# Patient Record
(demographics unavailable — no encounter records)

---

## 2025-03-11 NOTE — PHYSICAL EXAM
[2+] : left foot dorsalis pedis 2+ [Skin Color & Pigmentation] : normal skin color and pigmentation [Skin Turgor] : normal skin turgor [Skin Lesions] : no skin lesions [No Focal Deficits] : no focal deficits [Sensation] : the sensory exam was normal to light touch and pinprick [Deep Tendon Reflexes (DTR)] : deep tendon reflexes were 2+ and symmetric [Motor Exam] : the motor exam was normal [Ankle Swelling (On Exam)] : not present [Varicose Veins Of Lower Extremities] : not present [] : not present [Delayed in the Right Toes] : capillary refills normal in right toes [Delayed in the Left Toes] : capillary refills normal in the left toes [FreeTextEntry3] : Hair growth noted on digits. Proximal to distal cooling is within normal limits.  [de-identified] : He has equinus and tight posterior muscle group. He has fully compensated forefoot varus with flexible flatfoot with hyperpronation and a flexible low-profile arch with dorsiflexed 1st ray. I did a lower extremity examination and there is no limb length discrepancy. He has bilateral symmetrical ROM with no sign of torsion. [Foot Ulcer] : no foot ulcer [Skin Induration] : no skin induration [Diminished Throughout Right Foot] : normal sensation with monofilament testing throughout right foot [Diminished Throughout Left Foot] : normal sensation with monofilament testing throughout left foot

## 2025-03-11 NOTE — ASSESSMENT
[FreeTextEntry1] : Impression:  Painful flatfoot.  Treatment: I gave him a series of home therapy and stretching exercises that I think will be very beneficial.  I referred him to Aditya, the orthotist, for custom orthotics, which I think will be beneficial over time.

## 2025-03-11 NOTE — PHYSICAL EXAM
[2+] : left foot dorsalis pedis 2+ [Skin Color & Pigmentation] : normal skin color and pigmentation [Skin Turgor] : normal skin turgor [Skin Lesions] : no skin lesions [Sensation] : the sensory exam was normal to light touch and pinprick [No Focal Deficits] : no focal deficits [Deep Tendon Reflexes (DTR)] : deep tendon reflexes were 2+ and symmetric [Motor Exam] : the motor exam was normal [Ankle Swelling (On Exam)] : not present [] : not present [Varicose Veins Of Lower Extremities] : not present [Delayed in the Right Toes] : capillary refills normal in right toes [Delayed in the Left Toes] : capillary refills normal in the left toes [FreeTextEntry3] : Hair growth noted on digits. Proximal to distal cooling is within normal limits.  [de-identified] : He has equinus and tight posterior muscle group. He has fully compensated forefoot varus with flexible flatfoot with hyperpronation and a flexible low-profile arch with dorsiflexed 1st ray. I did a lower extremity examination and there is no limb length discrepancy. He has bilateral symmetrical ROM with no sign of torsion. [Foot Ulcer] : no foot ulcer [Skin Induration] : no skin induration [Diminished Throughout Right Foot] : normal sensation with monofilament testing throughout right foot [Diminished Throughout Left Foot] : normal sensation with monofilament testing throughout left foot

## 2025-03-11 NOTE — HISTORY OF PRESENT ILLNESS
[FreeTextEntry1] : Patient presents today for evaluation. Language line - [place] #[id number] translates. He has very flatfeet and he has generalized arch fatigue and sensitivity that the mom states the patient has. He had orthotics that were helpful, but they broke up. He has a very flat foot.

## 2025-03-11 NOTE — HISTORY OF PRESENT ILLNESS
[FreeTextEntry1] : Patient presents today for evaluation. Language line - [place] #[id number] translates. He has very flatfeet and he has generalized arch fatigue and sensitivity that the mom states the patient has. He had orthotics that were helpful, but they broke up. He has a very flat foot.

## 2025-03-11 NOTE — PHYSICAL EXAM
[2+] : left foot dorsalis pedis 2+ [Skin Color & Pigmentation] : normal skin color and pigmentation [Skin Turgor] : normal skin turgor [Skin Lesions] : no skin lesions [No Focal Deficits] : no focal deficits [Sensation] : the sensory exam was normal to light touch and pinprick [Deep Tendon Reflexes (DTR)] : deep tendon reflexes were 2+ and symmetric [Motor Exam] : the motor exam was normal [Ankle Swelling (On Exam)] : not present [Varicose Veins Of Lower Extremities] : not present [] : not present [Delayed in the Right Toes] : capillary refills normal in right toes [Delayed in the Left Toes] : capillary refills normal in the left toes [FreeTextEntry3] : Hair growth noted on digits. Proximal to distal cooling is within normal limits.  [de-identified] : He has equinus and tight posterior muscle group. He has fully compensated forefoot varus with flexible flatfoot with hyperpronation and a flexible low-profile arch with dorsiflexed 1st ray. I did a lower extremity examination and there is no limb length discrepancy. He has bilateral symmetrical ROM with no sign of torsion. [Foot Ulcer] : no foot ulcer [Skin Induration] : no skin induration [Diminished Throughout Right Foot] : normal sensation with monofilament testing throughout right foot [Diminished Throughout Left Foot] : normal sensation with monofilament testing throughout left foot

## 2025-03-11 NOTE — HISTORY OF PRESENT ILLNESS
[FreeTextEntry1] : Patient presents today for evaluation. Language line - [place] #[id number] translates. He has very flatfeet and he has generalized arch fatigue and sensitivity that the mom states the patient has. He had orthotics that were helpful, but they broke up. He has a very flat foot.